# Patient Record
Sex: MALE | Race: BLACK OR AFRICAN AMERICAN
[De-identification: names, ages, dates, MRNs, and addresses within clinical notes are randomized per-mention and may not be internally consistent; named-entity substitution may affect disease eponyms.]

---

## 2023-07-19 ENCOUNTER — TELEPHONE (OUTPATIENT)
Facility: CLINIC | Age: 58
End: 2023-07-19

## 2023-07-19 NOTE — TELEPHONE ENCOUNTER
----- Message from Carina Oliva sent at 7/14/2023 12:20 PM EDT -----  Subject: Appointment Request    Reason for Call: New Patient/New to Provider Appointment needed: New   Patient Request Appointment    QUESTIONS    Reason for appointment request? Available appointments did not meet   patient need     Additional Information for Provider? patient need a call back to establish   sooner date for monday only.    ---------------------------------------------------------------------------  --------------  Morteza COONEY  [phone]; OK to leave message on voicemail  ---------------------------------------------------------------------------  --------------  SCRIPT ANSWERS

## 2023-07-19 NOTE — TELEPHONE ENCOUNTER
Attempted to call patient to schedule NP appointment. No answer, left voicemail message advising patient that the first available appointment with Dr. Kain Lincoln or NP Simona Rosas is not until October, and to return call at earliest convenience. Thank you.

## 2023-09-21 ENCOUNTER — TELEPHONE (OUTPATIENT)
Facility: CLINIC | Age: 58
End: 2023-09-21

## 2024-05-14 ENCOUNTER — OFFICE VISIT (OUTPATIENT)
Age: 59
End: 2024-05-14
Payer: COMMERCIAL

## 2024-05-14 VITALS
OXYGEN SATURATION: 98 % | HEIGHT: 69 IN | WEIGHT: 173 LBS | SYSTOLIC BLOOD PRESSURE: 118 MMHG | BODY MASS INDEX: 25.62 KG/M2 | HEART RATE: 60 BPM | DIASTOLIC BLOOD PRESSURE: 90 MMHG

## 2024-05-14 DIAGNOSIS — F17.200 SMOKING: ICD-10-CM

## 2024-05-14 DIAGNOSIS — I10 PRIMARY HYPERTENSION: ICD-10-CM

## 2024-05-14 DIAGNOSIS — I82.622 ACUTE DEEP VEIN THROMBOSIS (DVT) OF OTHER VEIN OF LEFT UPPER EXTREMITY (HCC): Primary | ICD-10-CM

## 2024-05-14 PROCEDURE — 3080F DIAST BP >= 90 MM HG: CPT | Performed by: SURGERY

## 2024-05-14 PROCEDURE — 99203 OFFICE O/P NEW LOW 30 MIN: CPT | Performed by: SURGERY

## 2024-05-14 PROCEDURE — 3074F SYST BP LT 130 MM HG: CPT | Performed by: SURGERY

## 2024-05-14 RX ORDER — LATANOPROST 50 UG/ML
SOLUTION/ DROPS OPHTHALMIC
COMMUNITY

## 2024-05-14 NOTE — PROGRESS NOTES
Lobito Richard  Chief Complaint   Patient presents with    New Patient     Thyroid problems need neck scan     Referred by PCP, for abnormal findings noted on ultrasound of the thyroid    History and Physical    The patient is a 58-year-old male, who presents with his wife today, for evaluation of possible left internal jugular vein thrombosis, that was noted on ultrasound of the neck performed to evaluate the thyroid.  No history of catheters or lines on the left side of the neck.  No pain or swelling on the left side of the neck or the face.    His only significant medical history is hypertension-for which he presented  to the ED on 3/18/2024, as he ran out of medications.  He smokes about 10 cigarettes a day-decreased to 1 to 2 cigarettes recently.  Works in maintenance.  No alcohol or drug abuse.    No family history of bleeding or clotting disorders.    Duplex ultrasound of the neck today in the clinic: Patent bilateral internal jugular veins.  No evidence of DVT.  Prominent nodes on both sides of the neck    History reviewed. No pertinent surgical history.  Current Outpatient Medications   Medication Sig Dispense Refill    latanoprost (XALATAN) 0.005 % ophthalmic solution Ophthalmic for 90 Days       No current facility-administered medications for this visit.     No Known Allergies  Social History     Socioeconomic History    Marital status:      Spouse name: Not on file    Number of children: Not on file    Years of education: Not on file    Highest education level: Not on file   Occupational History    Not on file   Tobacco Use    Smoking status: Every Day     Types: Cigarettes    Smokeless tobacco: Not on file   Substance and Sexual Activity    Alcohol use: Not on file    Drug use: Not on file    Sexual activity: Not on file   Other Topics Concern    Not on file   Social History Narrative    Not on file     Social Determinants of Health     Financial Resource Strain: Not on file   Food Insecurity:

## 2024-05-14 NOTE — PROGRESS NOTES
1. Have you been to the ER, urgent care clinic since your last visit?  Hospitalized since your last visit?No    2. Have you seen or consulted any other health care providers outside of the [facility] since your last visit?  Include any pap smears or colon screening. Yes Reason for visit: pcp Aubrey aldrich [facility]